# Patient Record
Sex: MALE | URBAN - METROPOLITAN AREA
[De-identification: names, ages, dates, MRNs, and addresses within clinical notes are randomized per-mention and may not be internally consistent; named-entity substitution may affect disease eponyms.]

---

## 2023-01-30 ENCOUNTER — OFFICE VISIT (OUTPATIENT)
Dept: URBAN - METROPOLITAN AREA CLINIC 24 | Facility: CLINIC | Age: 53
End: 2023-01-30

## 2023-01-30 DIAGNOSIS — E11.9 TYPE 2 DIABETES MELLITUS W/O COMPLICATION: ICD-10-CM

## 2023-01-30 DIAGNOSIS — D23.10 OTHER BENIGN NEOPLASM OF SKIN OF EYELID INCLUDING CANTHUS: Primary | ICD-10-CM

## 2023-01-30 PROCEDURE — 99202 OFFICE O/P NEW SF 15 MIN: CPT | Performed by: OPHTHALMOLOGY

## 2023-01-30 RX ORDER — ERYTHROMYCIN 5 MG/G
OINTMENT OPHTHALMIC
Qty: 1 | Refills: 1 | Status: ACTIVE
Start: 2023-01-30

## 2023-01-30 RX ORDER — CEPHALEXIN 500 MG/1
500 MG CAPSULE ORAL
Qty: 30 | Refills: 0 | Status: ACTIVE
Start: 2023-01-30

## 2023-01-30 ASSESSMENT — INTRAOCULAR PRESSURE
OS: 18
OD: 18

## 2023-01-30 ASSESSMENT — VISUAL ACUITY
OS: 20/20
OD: 20/20

## 2023-01-30 ASSESSMENT — KERATOMETRY
OS: 43.73
OD: 43.38

## 2023-01-30 NOTE — IMPRESSION/PLAN
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 Plan: Discussed with patient the risks, benefits, and alternatives to surgery. Patient consents to proceed. Schedule for clinical procedure of removing Lesions from BALAJI and LLL in the [place] Office. Start ERYTHROMYCIN Yasmin QHS LLL and Balaji and Cephalexin 500mg Q8Hr PO. Patient to call and cancel if the condition resolves with Pharmaceutical intervention.

## 2023-01-30 NOTE — IMPRESSION/PLAN
Impression: Type 2 diabetes mellitus w/o complication: B90.0. Plan: Discussed good blood sugar and blood pressure - diet, exercise, and nutritional control emphasized to reduce future risk of diabetic complications. Maintain follow up with PCP.

## 2024-08-05 ENCOUNTER — OFFICE VISIT (OUTPATIENT)
Dept: URBAN - METROPOLITAN AREA CLINIC 44 | Facility: CLINIC | Age: 54
End: 2024-08-05
Payer: COMMERCIAL

## 2024-08-05 DIAGNOSIS — H00.011 HORDEOLUM EXTERNUM RIGHT UPPER EYELID: Primary | ICD-10-CM

## 2024-08-05 DIAGNOSIS — H10.011 ACUTE FOLLICULAR CONJUNCTIVITIS, RIGHT EYE: ICD-10-CM

## 2024-08-05 PROCEDURE — 99204 OFFICE O/P NEW MOD 45 MIN: CPT | Performed by: OPTOMETRIST

## 2024-08-05 RX ORDER — NEOMYCIN SULFATE, POLYMYXIN B SULFATE AND DEXAMETHASONE 1; 3.5; 1 MG/ML; MG/ML; [USP'U]/ML
SUSPENSION OPHTHALMIC
Qty: 5 | Refills: 0 | Status: ACTIVE
Start: 2024-08-05

## 2024-08-05 RX ORDER — CEPHALEXIN 500 MG/1
500 MG CAPSULE ORAL
Qty: 14 | Refills: 1 | Status: ACTIVE
Start: 2024-08-05

## 2024-08-05 ASSESSMENT — INTRAOCULAR PRESSURE
OD: 18
OS: 16